# Patient Record
Sex: MALE | Race: WHITE | NOT HISPANIC OR LATINO | Employment: FULL TIME | URBAN - METROPOLITAN AREA
[De-identification: names, ages, dates, MRNs, and addresses within clinical notes are randomized per-mention and may not be internally consistent; named-entity substitution may affect disease eponyms.]

---

## 2024-05-30 ENCOUNTER — OFFICE VISIT (OUTPATIENT)
Dept: UROLOGY | Facility: HOSPITAL | Age: 44
End: 2024-05-30
Payer: COMMERCIAL

## 2024-05-30 DIAGNOSIS — N52.9 ERECTILE DYSFUNCTION, UNSPECIFIED ERECTILE DYSFUNCTION TYPE: Primary | ICD-10-CM

## 2024-05-30 PROCEDURE — 99204 OFFICE O/P NEW MOD 45 MIN: CPT | Performed by: STUDENT IN AN ORGANIZED HEALTH CARE EDUCATION/TRAINING PROGRAM

## 2024-05-30 PROCEDURE — 99214 OFFICE O/P EST MOD 30 MIN: CPT | Performed by: STUDENT IN AN ORGANIZED HEALTH CARE EDUCATION/TRAINING PROGRAM

## 2024-05-30 RX ORDER — TADALAFIL 5 MG/1
5 TABLET ORAL DAILY
Qty: 30 TABLET | Refills: 3 | Status: SHIPPED | OUTPATIENT
Start: 2024-05-30 | End: 2024-09-27

## 2024-05-30 NOTE — PROGRESS NOTES
Subjective   Patient ID: Monster Castro is a 43 y.o. male    HPI  43 y.o. male who presenting with concerns about erectile dysfunction. He reports being able to achieve an erection but states that it does not last long. He has been  for 20 years and has three children. The patient is currently taking fluoxetine. He has previously tried as-needed Cialis, which helped with achieving an erection but did not prolong it. The patient denies any major medical conditions such as diabetes or hypertension but mentions being pre-diabetic, pre-cholesterol, and pre-hypertensive. He is interested in exploring other treatment options to improve his condition.    Review of Systems    All systems were reviewed. Anything negative was noted in the HPI.    Objective   Physical Exam  Genitourinary:     Pubic Area: No rash.       Penis: Normal and circumcised. No hypospadias.       Testes:         Right: Mass, tenderness, swelling, testicular hydrocele or varicocele not present. Right testis is descended.         Left: Mass, tenderness, swelling, testicular hydrocele or varicocele not present. Left testis is descended.      Epididymis:      Right: Not inflamed or enlarged. No mass or tenderness.      Left: Not inflamed or enlarged. No mass or tenderness.         General: Well developed, well nourished, alert and cooperative, appears in no acute distress   Eyes: Non-injected conjunctiva, sclera clear, no proptosis   Cardiac: Extremities are warm and well perfused. No edema, cyanosis or pallor   Lungs: Breathing is easy, non-labored. Speaking in clear and complete sentences. Normal diaphragmatic movement   MSK: Ambulatory with steady gait, unassisted   Neuro: Alert and oriented to person, place, and time   Psych: Demonstrates good judgment and reason, without hallucinations, abnormal affect or abnormal behaviors   Skin: No obvious lesions, no rashes       No CVA tenderness bilaterally   No suprapubic pain or discomfort       Past  Medical History:   Diagnosis Date    Other specified health status     No pertinent past surgical history         No past surgical history on file.        Assessment/Plan   Erectile Dysfunction (ED)    43 y.o. male who presents for the above condition, We had a very long and extensive discussion with the patient regarding the pathophysiology, differential diagnosis, risk factor, management, natural history, incidence and diagnostic work-up of the condition.      - Recommend over-the-counter Promescent (Lidocaine gel or spray) to be applied to the back of the penis 15 minutes before intercourse to help delay ejaculation.  - Refer the patient to Dr. Chavez, a sex therapist, for further evaluation and management of potential psychogenic factors contributing to ED.    Plan:  - Refer to Dr. Myrick  - Tadalafil 5 mg/day      5/30/2024    Norris Attestation  By signing my name below, IAntonia Scribe   attest that this documentation has been prepared under the direction and in the presence of Dr. Elpidio Julio

## 2024-07-11 ENCOUNTER — APPOINTMENT (OUTPATIENT)
Dept: UROLOGY | Facility: HOSPITAL | Age: 44
End: 2024-07-11
Payer: COMMERCIAL

## 2024-07-11 DIAGNOSIS — F41.9 ANXIETY: ICD-10-CM

## 2024-07-11 DIAGNOSIS — F52.4 PREMATURE EJACULATION: Primary | ICD-10-CM

## 2024-07-11 PROCEDURE — 90837 PSYTX W PT 60 MINUTES: CPT | Performed by: PSYCHOLOGIST

## 2024-08-01 PROBLEM — F52.4 PREMATURE EJACULATION: Status: ACTIVE | Noted: 2024-08-01

## 2024-08-01 NOTE — PROGRESS NOTES
Outpatient Psychology Initial Appointment  Subjective   Monster Castro, a 44 y.o. male, for initial evaluation visit. Participated in diagnostic interview and identification of goals for treatment.      Patient is referred by Elpidio Julio MD.      Reason:  He has been experiencing distress due to sexual difficulties.      Psychosocial History Monster is currently presenting for psychological/sex therapy services under the referral of his urologist.  He states that he has been having some difficulties for some time.  He is currently prescribed Tadalafil for erection, and has been recommended use of a delay spray for assistance with rapid ejaculation/climax.  He is additionally prescribed Fluoxatine to help as well. He currently estimates his time to climax is approximately 10 seconds.      Monster reports that he is  for 20 years and has six children all boys.  He is of [employer] background and states that he never got much sexuality education growing up.  He avoids masturbation due to Mandaeism reasons.      He has goals for improved sexual satisfaction and functioning.     Mental Status Evaluation:  Appearance:  age appropriate   Behavior:  normal   Speech:  normal pitch and normal volume   Mood:  normal   Affect:  normal   Thought Process:  normal   Thought Content:  normal   Sensorium:  person, place, time/date, situation, day of week, month of year, and year   Cognition:  grossly intact   Insight:  Intact, as evidenced by ability to express internal thoughts, feelings, and insights.          Assessment/Plan     Diagnosis: There is no problem list on file for this patient.      Treatment Goals:  Specify outcomes written in observable, behavioral terms:   Anxiety: reducing physical symptoms of anxiety  Improved sexual functioning and satisfaction.     Treatment Plan/Recommendations: Provided psychoeducation/psychosexual education regarding the overlay of mental health and sexual health as it pertains to  patient's presenting concerns.    Teach cognitive and behavioral strategies towards improvement in sexual functioning.  Teach intimacy skills towards improved satisfaction       Review with patient: Treatment plan reviewed with the patient.    Markie Chavez PsyD

## 2024-08-27 ENCOUNTER — APPOINTMENT (OUTPATIENT)
Dept: UROLOGY | Facility: HOSPITAL | Age: 44
End: 2024-08-27
Payer: COMMERCIAL

## 2024-08-29 ENCOUNTER — OFFICE VISIT (OUTPATIENT)
Dept: UROLOGY | Facility: HOSPITAL | Age: 44
End: 2024-08-29
Payer: COMMERCIAL

## 2024-08-29 DIAGNOSIS — F52.4 PREMATURE EJACULATION: Primary | ICD-10-CM

## 2024-08-29 PROCEDURE — 99214 OFFICE O/P EST MOD 30 MIN: CPT | Performed by: STUDENT IN AN ORGANIZED HEALTH CARE EDUCATION/TRAINING PROGRAM

## 2024-08-29 NOTE — PROGRESS NOTES
Subjective   Patient ID: Monster Castro is a 44 y.o. male    HPI  44 y.o. male who presenting with concerns about erectile dysfunction and premature ejaculation. He reports being able to achieve an erection but states that it does not last long, less than a minute. He has been  for 20 years and has 6 children. The patient is currently taking fluoxetine. He has previously tried as-needed Cialis, which helped with achieving an erection but had a headache it. The patient denies any major medical conditions such as diabetes or hypertension but mentions being pre-diabetic, pre-cholesterol, and pre-hypertensive. He is interested in exploring other treatment options to improve his condition.    He reports that fluoxetine has been prescribed to help with ejaculation but notes that it has caused erectile dysfunction, a common side effect of SSRIs. The patient describes a decrease in sensitivity and quality of erections. He has not had his testosterone levels checked, which could be contributing to his symptoms. The patient is also experiencing some fatigue and difficulty focusing, which could be related to multiple factors including sleep apnea, thyroid issues, low vitamin D, or the SSRI medication    Review of Systems    All systems were reviewed. Anything negative was noted in the HPI.    Objective   Physical Exam  Genitourinary:     Pubic Area: No rash.       Penis: Normal and circumcised. No hypospadias.       Testes:         Right: Mass, tenderness, swelling, testicular hydrocele or varicocele not present. Right testis is descended.         Left: Mass, tenderness, swelling, testicular hydrocele or varicocele not present. Left testis is descended.      Epididymis:      Right: Not inflamed or enlarged. No mass or tenderness.      Left: Not inflamed or enlarged. No mass or tenderness.         General: Well developed, well nourished, alert and cooperative, appears in no acute distress   Eyes: Non-injected conjunctiva,  sclera clear, no proptosis   Cardiac: Extremities are warm and well perfused. No edema, cyanosis or pallor   Lungs: Breathing is easy, non-labored. Speaking in clear and complete sentences. Normal diaphragmatic movement   MSK: Ambulatory with steady gait, unassisted   Neuro: Alert and oriented to person, place, and time   Psych: Demonstrates good judgment and reason, without hallucinations, abnormal affect or abnormal behaviors   Skin: No obvious lesions, no rashes       No CVA tenderness bilaterally   No suprapubic pain or discomfort       Past Medical History:   Diagnosis Date    Other specified health status     No pertinent past surgical history         No past surgical history on file.        Assessment/Plan   Erectile Dysfunction (ED) headache with 5mg Tadalafil, premature ejaculation     44 y.o. male who presents for the above condition, We had a very long and extensive discussion with the patient regarding the pathophysiology, differential diagnosis, risk factor, management, natural history, incidence and diagnostic work-up of the condition.      - Discussed the side effects of fluoxetine and its impact on erectile function.  - Recommended trying non-pharmacological methods for managing premature ejaculation, including using a condom and applying a desensitizing gel.  - Discussed the potential benefits and drawbacks of testosterone replacement therapy, including the need for regular monitoring and the possible side effects.  - Suggested trying other methods to improve symptoms before considering testosterone testing and replacement.  - Educated the patient on the importance of mental health and managing anxiety as a priority over sexual function.    Plan:  - Refer to Dr. Myrick  - Follow up in 4 months  Tadalafil 2.5mg daily       8/29/2024    Scribe Attestation  By signing my name below, IAntonia, Scrgabie   attest that this documentation has been prepared under the direction and in the presence of  Dr. Elpidio Julio

## 2024-09-26 ENCOUNTER — APPOINTMENT (OUTPATIENT)
Dept: UROLOGY | Facility: HOSPITAL | Age: 44
End: 2024-09-26
Payer: COMMERCIAL

## 2024-12-16 NOTE — PROGRESS NOTES
Subjective   Patient ID: Monster Castro is a 44 y.o. male    HPI  44 y.o. male who presents for a 4 month follow-up visit with concerns about erectile dysfunction and premature ejaculation. He reports being able to achieve an erection but states that it does not last long, less than a minute. He has been  for 20 years and has 6 children. The patient is currently taking fluoxetine. He has previously tried as-needed Cialis, which helped with achieving an erection but had a headache it. The patient denies any major medical conditions such as diabetes or hypertension but mentions being pre-diabetic, pre-cholesterol, and pre-hypertensive. He is interested in exploring other treatment options to improve his condition.    He reports that fluoxetine has been prescribed to help with ejaculation but notes that it has caused erectile dysfunction, a common side effect of SSRIs. The patient describes a decrease in sensitivity and quality of erections. He has not had his testosterone levels checked, which could be contributing to his symptoms. The patient is also experiencing some fatigue and difficulty focusing, which could be related to multiple factors including sleep apnea, thyroid issues, low vitamin D, or the SSRI medication    He reports currently taking Fluoxetine, but is inquiring about Paroxetine/ Paxil to delay his ejaculation. He will speak with his physiatrist or PCP about switching from Fluoxetine to Paroxetine. He reports seeing Dr. Myrick 07/11/2024, however he has been having some trouble getting back in to see him.       Review of Systems    All systems were reviewed. Anything negative was noted in the HPI.    Objective   Physical Exam  Genitourinary:     Pubic Area: No rash.       Penis: Normal and circumcised. No hypospadias.       Testes:         Right: Mass, tenderness, swelling, testicular hydrocele or varicocele not present. Right testis is descended.         Left: Mass, tenderness, swelling,  testicular hydrocele or varicocele not present. Left testis is descended.      Epididymis:      Right: Not inflamed or enlarged. No mass or tenderness.      Left: Not inflamed or enlarged. No mass or tenderness.         General: Well developed, well nourished, alert and cooperative, appears in no acute distress   Eyes: Non-injected conjunctiva, sclera clear, no proptosis   Cardiac: Extremities are warm and well perfused. No edema, cyanosis or pallor   Lungs: Breathing is easy, non-labored. Speaking in clear and complete sentences. Normal diaphragmatic movement   MSK: Ambulatory with steady gait, unassisted   Neuro: Alert and oriented to person, place, and time   Psych: Demonstrates good judgment and reason, without hallucinations, abnormal affect or abnormal behaviors   Skin: No obvious lesions, no rashes       No CVA tenderness bilaterally   No suprapubic pain or discomfort       Past Medical History:   Diagnosis Date    Other specified health status     No pertinent past surgical history         No past surgical history on file.        Assessment/Plan   Erectile Dysfunction (ED) headache with 5mg Tadalafil, tolerating well the 2.5mg, premature ejaculation improving with lido cream    44 y.o. male who presents for the above condition, We had a very long and extensive discussion with the patient regarding the pathophysiology, differential diagnosis, risk factor, management, natural history, incidence and diagnostic work-up of the condition.        - Avoid caffeine entirely     - Implement a timed voiding schedule (every 2 to 2.5 hours).    - Discussed the importance of controlling fluid intake and avoiding caffeine.    Plan:  - Follow-up in 4 months  - Refer to Dr. Myrick    E&M visit today is associated with current or anticipated ongoing medical care services related to a patient's single, serious condition or a complex condition.     12/17/2024    Scribe Attestation  By signing my name below, I, Lina Porter,  Scribe attest that this documentation has been prepared under the direction and in the presence of Dr. Elpidio Julio.

## 2024-12-17 ENCOUNTER — APPOINTMENT (OUTPATIENT)
Dept: UROLOGY | Facility: CLINIC | Age: 44
End: 2024-12-17
Payer: COMMERCIAL

## 2024-12-17 ENCOUNTER — OFFICE VISIT (OUTPATIENT)
Dept: UROLOGY | Facility: HOSPITAL | Age: 44
End: 2024-12-17
Payer: COMMERCIAL

## 2024-12-17 DIAGNOSIS — N52.9 ERECTILE DYSFUNCTION, UNSPECIFIED ERECTILE DYSFUNCTION TYPE: Primary | ICD-10-CM

## 2024-12-17 PROCEDURE — 99214 OFFICE O/P EST MOD 30 MIN: CPT | Performed by: STUDENT IN AN ORGANIZED HEALTH CARE EDUCATION/TRAINING PROGRAM

## 2024-12-17 PROCEDURE — G2211 COMPLEX E/M VISIT ADD ON: HCPCS | Performed by: STUDENT IN AN ORGANIZED HEALTH CARE EDUCATION/TRAINING PROGRAM

## 2024-12-31 ENCOUNTER — APPOINTMENT (OUTPATIENT)
Dept: UROLOGY | Facility: HOSPITAL | Age: 44
End: 2024-12-31
Payer: COMMERCIAL

## 2025-01-14 ENCOUNTER — APPOINTMENT (OUTPATIENT)
Dept: UROLOGY | Facility: CLINIC | Age: 45
End: 2025-01-14
Payer: COMMERCIAL

## 2025-01-14 DIAGNOSIS — N52.9 ERECTILE DYSFUNCTION, UNSPECIFIED ERECTILE DYSFUNCTION TYPE: ICD-10-CM

## 2025-01-14 DIAGNOSIS — F52.4 PREMATURE EJACULATION: ICD-10-CM

## 2025-01-14 DIAGNOSIS — F41.9 ANXIETY: Primary | ICD-10-CM

## 2025-01-14 PROCEDURE — 90834 PSYTX W PT 45 MINUTES: CPT | Performed by: PSYCHOLOGIST

## 2025-02-06 PROBLEM — F41.9 ANXIETY: Status: ACTIVE | Noted: 2025-02-06

## 2025-02-06 PROBLEM — N52.9 ERECTILE DYSFUNCTION: Status: ACTIVE | Noted: 2025-02-06

## 2025-02-06 NOTE — PROGRESS NOTES
Start Time: 3:00  End Time: 3:51    Session format: Face-to-face  Session location: Outpatient clinic    Focus of treatment:   Problem List Items Addressed This Visit       Premature ejaculation    Anxiety - Primary    Erectile dysfunction       Session Content    The specific focus and goals for this session were anxiety and sexual functioning improvement.     We addressed the following therapy components during this session: Monster reports that there have been overall improvements, which appear to coincide with the use of the medication.  He and his wife had some discussions based on the previous session.  Processed through how that conversation went and ways to continue to develop that dialogue.  He states that they have had limited times of intercourse, so was unable to observe as much of a benefit during partnered activities.  Identified some additional steps for Monster to take to address the overall functioning, and ways to move towards more opportunity to engage with partner and to build intimacy.     Next Appointment: 3/4/2025

## 2025-03-03 DIAGNOSIS — N52.9 ERECTILE DYSFUNCTION, UNSPECIFIED ERECTILE DYSFUNCTION TYPE: Primary | ICD-10-CM

## 2025-03-03 RX ORDER — TADALAFIL 5 MG/1
5 TABLET ORAL DAILY
Qty: 30 TABLET | Refills: 3 | Status: SHIPPED | OUTPATIENT
Start: 2025-03-03 | End: 2025-07-01

## 2025-03-04 ENCOUNTER — APPOINTMENT (OUTPATIENT)
Dept: UROLOGY | Facility: CLINIC | Age: 45
End: 2025-03-04
Payer: COMMERCIAL

## 2025-12-26 ENCOUNTER — APPOINTMENT (OUTPATIENT)
Dept: SLEEP MEDICINE | Facility: CLINIC | Age: 45
End: 2025-12-26
Payer: COMMERCIAL